# Patient Record
Sex: MALE | Race: WHITE | URBAN - METROPOLITAN AREA
[De-identification: names, ages, dates, MRNs, and addresses within clinical notes are randomized per-mention and may not be internally consistent; named-entity substitution may affect disease eponyms.]

---

## 2022-02-16 ENCOUNTER — OFFICE VISIT (OUTPATIENT)
Dept: URBAN - METROPOLITAN AREA CLINIC 26 | Facility: CLINIC | Age: 62
End: 2022-02-16
Payer: COMMERCIAL

## 2022-02-16 DIAGNOSIS — H00.12 CHALAZION RIGHT LOWER LID: ICD-10-CM

## 2022-02-16 DIAGNOSIS — H04.123 TEAR FILM INSUFFICIENCY OF BILATERAL LACRIMAL GLANDS: ICD-10-CM

## 2022-02-16 DIAGNOSIS — H25.13 AGE-RELATED NUCLEAR CATARACT, BILATERAL: Primary | ICD-10-CM

## 2022-02-16 PROCEDURE — 92004 COMPRE OPH EXAM NEW PT 1/>: CPT | Performed by: OPTOMETRIST

## 2022-02-16 PROCEDURE — 92015 DETERMINE REFRACTIVE STATE: CPT | Performed by: OPTOMETRIST

## 2022-02-16 PROCEDURE — 92134 CPTRZ OPH DX IMG PST SGM RTA: CPT | Performed by: OPTOMETRIST

## 2022-02-16 RX ORDER — DOXYCYCLINE 100 MG/1
100 MG TABLET, FILM COATED ORAL
Qty: 30 | Refills: 0 | Status: INACTIVE
Start: 2022-02-16 | End: 2022-03-14

## 2022-02-16 ASSESSMENT — VISUAL ACUITY
OD: 20/20
OS: 20/20

## 2022-02-16 ASSESSMENT — KERATOMETRY
OS: 44.13
OD: 43.63

## 2022-02-16 ASSESSMENT — INTRAOCULAR PRESSURE
OS: 14
OD: 14

## 2022-02-16 NOTE — IMPRESSION/PLAN
Impression: Chalazion right lower lid with blepharitis/MGD Plan: Explained diagnosis in detail to patient. Recommend WCs/lid hygiene BID with lid massage. Pt. understands this may not cure symptoms, they may need to be on maintenance regimen and it will take a few weeks for improvement. Begin Doxycycline 100mg daily for 4 wks, then 50mg for 4 wks Continue Maxitrol karin BID OU for 2 weeks

## 2022-03-22 ENCOUNTER — OFFICE VISIT (OUTPATIENT)
Dept: URBAN - METROPOLITAN AREA CLINIC 26 | Facility: CLINIC | Age: 62
End: 2022-03-22
Payer: COMMERCIAL

## 2022-03-22 PROCEDURE — 99213 OFFICE O/P EST LOW 20 MIN: CPT | Performed by: OPTOMETRIST

## 2022-03-22 RX ORDER — NEOMYCIN, POLYMYXIN B SULFATES, DEXAMETHASONE 1; 3.5; 1 MG/G; MG/G; [USP'U]/G
OINTMENT OPHTHALMIC
Qty: 3.5 | Refills: 1 | Status: ACTIVE
Start: 2022-03-22

## 2022-03-22 ASSESSMENT — INTRAOCULAR PRESSURE
OD: 12
OS: 11

## 2022-03-22 NOTE — IMPRESSION/PLAN
Impression: Tear film insufficiency of bilateral lacrimal glands: H04.123. Plan: MGD OU with multiple chalazions OU. minimal improvement with use of wc's and lid scrubs daily, and doxy and maxitrol karin. continue wc's bid OU followed by lid scrubs. continue doxy 50mg daily PO x 4 weeks. restart maxitrol karin bid OU x 2 weeks. recommend next available consult with Dr[facility]

## 2022-03-30 ENCOUNTER — OFFICE VISIT (OUTPATIENT)
Dept: URBAN - METROPOLITAN AREA CLINIC 30 | Facility: CLINIC | Age: 62
End: 2022-03-30
Payer: COMMERCIAL

## 2022-03-30 DIAGNOSIS — H00.14 CHALAZION LEFT UPPER LID: ICD-10-CM

## 2022-03-30 DIAGNOSIS — H01.001 UNSPECIFIED BLEPARITIS OF RIGHT UPPER EYELID: ICD-10-CM

## 2022-03-30 DIAGNOSIS — H00.021 HORDEOLUM INTERNUM RIGHT UPPER LID: ICD-10-CM

## 2022-03-30 DIAGNOSIS — H01.004 UNSPECIFIED BLEPHARITIS OF LEFT UPPER EYELID: ICD-10-CM

## 2022-03-30 PROCEDURE — 83861 MICROFLUID ANALY TEARS: CPT | Performed by: OPTOMETRIST

## 2022-03-30 PROCEDURE — 99214 OFFICE O/P EST MOD 30 MIN: CPT | Performed by: OPTOMETRIST

## 2022-03-30 PROCEDURE — 0330T TEAR FILM IMG UNI/BI W/I&R: CPT | Performed by: OPTOMETRIST

## 2022-03-30 RX ORDER — DOXYCYCLINE 50 MG/1
50 MG TABLET, FILM COATED ORAL
Qty: 30 | Refills: 3 | Status: ACTIVE
Start: 2022-03-30

## 2022-03-30 RX ORDER — NEOMYCIN SULFATE, POLYMYXIN B SULFATE AND DEXAMETHASONE 3.5; 10000; 1 MG/G; [USP'U]/G; MG/G
OINTMENT OPHTHALMIC
Qty: 3.5 | Refills: 0 | Status: ACTIVE
Start: 2022-03-30

## 2022-03-30 NOTE — IMPRESSION/PLAN
Impression: Unspecified bleparitis of right upper eyelid: H01.001. Plan: Discussed lid Hygiene. Recommend Avenova/ Ocusoft/ Blephadex.

## 2022-03-30 NOTE — IMPRESSION/PLAN
Impression: Tear film insufficiency of bilateral lacrimal glands: H04.123. 
+rosacea
03/2022 Osmo 374, error 03/2022Schrimer 15,17
03/2022 mild whit Plan: Daily computer use- rec blinking exercises. AT's qid OU. Recommend O3's. Avoid ceiling fans. + sleep mask due to mild lagopthalmos. DEC 3/2022 . Pt ed of condition that DED tends to be chronic (there is no cure) and will take time to treat based on severity. Discussed in detail ADDE/ALF. Possible sx removal of stye in future. Transillumination results indicate OD %, OS % MG atrophy. Discussed options for treatment such as IPL (x4) in order to maintain MG function. Pt aware of out of pocket cost $350.

## 2022-03-30 NOTE — IMPRESSION/PLAN
Impression: Chalazion left upper and lower lid with blepharitis/MGD Plan: DEC today, see other notes.

## 2022-03-30 NOTE — IMPRESSION/PLAN
Impression: Hordeolum internum right upper lid: H00.021.
+rosacea Plan: MGD OU with multiple chalazions OU. minimal improvement with use of wc's and lid scrubs daily, and doxy and maxitrol karin. continue wc's bid OU followed by lid scrubs. continue doxy 50mg daily PO QD x 4 weeks. restart maxitrol karin bid OU x 2 weeks.

## 2022-09-14 ENCOUNTER — OFFICE VISIT (OUTPATIENT)
Dept: URBAN - METROPOLITAN AREA CLINIC 30 | Facility: CLINIC | Age: 62
End: 2022-09-14

## 2022-09-14 DIAGNOSIS — H00.021 HORDEOLUM INTERNUM RIGHT UPPER LID: Primary | ICD-10-CM

## 2022-09-14 DIAGNOSIS — H04.123 TEAR FILM INSUFFICIENCY OF BILATERAL LACRIMAL GLANDS: ICD-10-CM

## 2022-09-14 RX ORDER — DOXYCYCLINE 50 MG/1
50 MG TABLET, FILM COATED ORAL
Qty: 30 | Refills: 3 | Status: ACTIVE
Start: 2022-09-14

## 2022-09-14 NOTE — IMPRESSION/PLAN
Impression: Tear film insufficiency of bilateral lacrimal glands: H04.123. 
+rosacea
03/2022 Osmo 374, error 03/2022Schrimer 15,17
03/2022 mild whit Plan: 1st IPL today. Daily computer use- rec blinking exercises. Continue AT's qid OU. Recommend O3's. Avoid ceiling fans. + sleep mask due to mild lagopthalmos. DEC 3/2022. Pt ed of condition that DED tends to be chronic (there is no cure) and will take time to treat based on severity. Discussed in detail ADDE/ALF. Possible sx removal of stye in future. Transillumination results indicate OD %, OS % MG atrophy. Discussed options for treatment such as IPL (x4) in order to maintain MG function. Pt aware of out of pocket cost $350.

## 2022-09-14 NOTE — IMPRESSION/PLAN
Impression: Hordeolum internum right upper lid: H00.021.
+rosacea Plan: 1st IPL today. MGD OU with multiple chalazions OU. Minimal improvement with use of wc's and lid scrubs daily, and doxy and maxitrol karin. Continue wc's bid OU followed by lid scrubs- gave sample of lid scrubs today. Restart Doxy 50mg QD PO x 2 months, pt will stop x 3 days prior to next IPL. Pt will D/C doxy while he is in [place]. Continue maxitrol karin OU QHS.

## 2022-11-02 ENCOUNTER — OFFICE VISIT (OUTPATIENT)
Dept: URBAN - METROPOLITAN AREA CLINIC 30 | Facility: CLINIC | Age: 62
End: 2022-11-02

## 2022-11-02 DIAGNOSIS — H04.123 TEAR FILM INSUFFICIENCY OF BILATERAL LACRIMAL GLANDS: Primary | ICD-10-CM

## 2022-11-02 RX ORDER — NEOMYCIN SULFATE, POLYMYXIN B SULFATE AND DEXAMETHASONE 3.5; 10000; 1 MG/G; [USP'U]/G; MG/G
OINTMENT OPHTHALMIC
Qty: 3.5 | Refills: 0 | Status: ACTIVE
Start: 2022-11-02

## 2022-11-02 NOTE — IMPRESSION/PLAN
Impression: Tear film insufficiency of bilateral lacrimal glands: H04.123. 
+rosacea
03/2022 Osmo 374, error 03/2022Schrimer 15,17
03/2022 mild whit Plan: 2nd IPL  today. Daily computer use- rec blinking exercises. Continue AT's qid OU. Recommend O3's. Avoid ceiling fans. + sleep mask due to mild lagopthalmos. Pt did not use the ointment , rec to continue . ERx maxitrol today  to the lid margin. DEC 3/2022. Pt ed of condition that DED tends to be chronic (there is no cure) and will take time to treat based on severity. Discussed in detail ADDE/ALF. Possible sx removal of stye in future. Transillumination results indicate OD %, OS % MG atrophy. Discussed options for treatment such as IPL (x4) in order to maintain MG function. Pt aware of out of pocket cost $350.